# Patient Record
Sex: FEMALE | Race: WHITE | NOT HISPANIC OR LATINO | URBAN - METROPOLITAN AREA
[De-identification: names, ages, dates, MRNs, and addresses within clinical notes are randomized per-mention and may not be internally consistent; named-entity substitution may affect disease eponyms.]

---

## 2017-02-14 ENCOUNTER — OFFICE VISIT - RIVER FALLS (OUTPATIENT)
Dept: FAMILY MEDICINE | Facility: CLINIC | Age: 47
End: 2017-02-14

## 2017-02-14 ASSESSMENT — MIFFLIN-ST. JEOR: SCORE: 1514

## 2017-03-29 ENCOUNTER — OFFICE VISIT - RIVER FALLS (OUTPATIENT)
Dept: FAMILY MEDICINE | Facility: CLINIC | Age: 47
End: 2017-03-29

## 2017-03-29 ASSESSMENT — MIFFLIN-ST. JEOR: SCORE: 1522.62

## 2017-05-01 ENCOUNTER — OFFICE VISIT - RIVER FALLS (OUTPATIENT)
Dept: FAMILY MEDICINE | Facility: CLINIC | Age: 47
End: 2017-05-01

## 2022-02-12 VITALS
BODY MASS INDEX: 34.72 KG/M2 | HEART RATE: 68 BPM | WEIGHT: 203.4 LBS | DIASTOLIC BLOOD PRESSURE: 76 MMHG | SYSTOLIC BLOOD PRESSURE: 122 MMHG | HEIGHT: 64 IN | DIASTOLIC BLOOD PRESSURE: 94 MMHG | BODY MASS INDEX: 35.22 KG/M2 | SYSTOLIC BLOOD PRESSURE: 144 MMHG | WEIGHT: 205.2 LBS | HEART RATE: 92 BPM | TEMPERATURE: 99.1 F | TEMPERATURE: 98.9 F

## 2022-02-12 VITALS
BODY MASS INDEX: 36.32 KG/M2 | HEIGHT: 63 IN | DIASTOLIC BLOOD PRESSURE: 76 MMHG | TEMPERATURE: 98.8 F | HEART RATE: 78 BPM | SYSTOLIC BLOOD PRESSURE: 122 MMHG | OXYGEN SATURATION: 98 % | WEIGHT: 205 LBS

## 2022-02-15 NOTE — PROGRESS NOTES
Patient:   PRABHA KING            MRN: [id number]            FIN: [id number]               Age:   47 years     Sex:  Female     :  1970   Associated Diagnoses:   Motion sickness; Otalgia   Author:   Mohit aDley MD      Chief Complaint   3/29/2017 9:32 AM CDT    Pt. would like ears checked-occasional sharp pain noted @ both. Ear infection @ R ear in Feb. Treat motion sickness when flying.      History of Present Illness   Patient with intermittent episodes of sharp, stabbing ear pain. Has occurred in both but right ear has been worse lately. Had sinus infection in February with ear infection at that time per patient.  Patient reports her ears have been giving her trouble for many years. Feels like they are chronically congested.   Has had problems with flying for several years as well. Reports it starts when she is boarding the plane. Has nausea. Has to keep eyes closed. Does not think it is anxiety or claustrophobia. Has tried dramamine/meclizing without relief. Leaving for [place] soon. Wondering about trying an alternative.      Health Status   Allergies:    Allergic Reactions (All)  Severity Not Documented  Cephalexin (Diarrhea)  Colace (Rash)  Effexor (No reactions were documented)  Flagyl 375 (No reactions were documented)  Lisinopril (Cough)  Pecans (No reactions were documented)  Zyrtec (Severe h/a's)  Nonallergic Reactions (All)  Severity Not Documented  AmLODIPine (Bladder infections)  Penicillin (Yeast inf)  Vicodin (Dry heaving)   Medications:  (Selected)   Prescriptions  Prescribed  Allegra 24 Hour Allergy oral tablet: 1 tab(s) ( 180 mg ), po, daily, PRN: as needed for hives, # 30 tab(s), 5 Refill(s), Type: Maintenance, Pharmacy: [facility] #[id number]  Flonase 50 mcg/inh nasal spray: 2 spray(s), nasal, daily, # 1 EA, 11 Refill(s), Type: Maintenance, Pharmacy: [facility] #[id number], 2 spray(s) nasal daily  hydrochlorothiazide-losartan 12.5 mg-50 mg oral tablet: 1 tab(s), PO, Daily, # 30  tab(s), 11 Refill(s), Type: Maintenance, Pharmacy: [facility] #[id number], 1 tab(s) po daily,x30 day(s)  Documented Medications  Documented  Vitamin C: 0 Refill(s), Type: Maintenance  Vitamin D3: po, daily, 0 Refill(s), Type: Maintenance  ibuprofen 200 mg oral tablet: 1 tab(s) ( 200 mg ), po, q4 hrs, 0 Refill(s), Type: Maintenance   Problem list:    All Problems (Selected)  Seasonal Allergies / ICD-9-.9 / Confirmed  Sacroiliac Joint Dysfunction / ICD-9-.4 / Confirmed  Perimenopausal vasomotor symptoms / SNOMED CT 067741437 / Confirmed  Pain in Back / ICD-9-.5 / Confirmed  Obesity / ICD-9-.00 / Probable  Impaired fasting glucose / SNOMED CT 0816109555 / Confirmed  Hypertension / SNOMED CT 9300901468 / Confirmed  Hyperlipidemia / SNOMED CT 68438303 / Confirmed  History of heat-induced urticaria / SNOMED CT 625409392 / Confirmed  Fibrocystic breast disease in female / ICD-9-.1 / Confirmed  Ex-Smoker / ICD-9-CM V15.82 / Confirmed  DJD (Degenerative Joint Disease) of Lumbar Spine / ICD-9-.3 / Confirmed  Chickenpox / ICD-9-.9 / Confirmed      Histories   Family History:    Diabetes mellitus type II  Mother (Ashley)  Hypertension  Father (Suman)  Heart disease  Father (Suman)  Hypercholesterolemia  Mother (sAhley)  Cancer  Brother (Ramez)  Comments:  8/13/2012 2:37 PM - Nena Langley  Rectal cancer survivor.  Breast Cancer  Sister (Bambi)  Comments:  8/13/2012 2:37 PM - Nena Langley  Breast cancer survivor  Diabetes  Sister (Rose Mary)  Grandmother (M)  Myocardial Infarction  Sister (Rose Mary)  Comments:  8/13/2012 2:37 PM - Nena Langley  Heart attack at the age of 43.  Diverticulitis  Father (Suman)     Procedure history:    Colonoscopy ([id number]) on 4/15/2014 at 44 Years.  Comments:  4/29/2014 5:50 PM - Gladys Vega RN  Sedation: MAC  Indication: family history colon cancer, brother before age 60  Benign Leimomyomata  Repeat in 5 years  left lower abdominal wall endometrioma excision  on 4/15/2014 at 44 Years.  Epidural steroid injection ([id number]) on 10/24/2008 at 38 Years.  Comments:  2012 3:13 PM - Nena Langley  Patient also received epidural steroid injections in 2004.    2012 3:05 PM Nena De La Fuente Dr. at [facility].  Both sides.  L4-5 level.  Laparoscopic supracervical hysterectomy ([id number]) on 2008 at 38 Years.  Comments:  2012 3:06 PM - Nena Langley  Also done:  tension-free transvaginal t ape placement with cystoscopy.  Childbirth ([id number]) in the month of 1996 at 26 Years.  Comments:  2012 2:51 PM - Nena Langley  Vaginal birth of daughterRahel.  Colposcopy ([id number]) on 1992 at 22 Years.  Comments:  2012 3:19 PM - Nena Langley  The endocervical curettings revealed no significant koilocytotic atypia and no evidence of dysplasia.  The uterine cervix biopsy revealed mild koilocytotic atypia, no evidence of dysplasia.  Childbirth ([id number]) on 2/15/1992 at 22 Years.  Comments:  2012 2:48 PM - Nena Langley  .  Birth of sonJesu.      Physical Examination   Vital Signs   3/29/2017 9:32 AM CDT Temperature Tympanic 98.9 DegF    Peripheral Pulse Rate 68 bpm    Pulse Site Radial artery    HR Method Manual    Systolic Blood Pressure 122 mmHg    Diastolic Blood Pressure 76 mmHg    Mean Arterial Pressure 91 mmHg    BP Site Left arm    BP Method Manual      Measurements from flowsheet : Measurements   3/29/2017 9:32 AM CDT Height Measured - Standard 64 in    Weight Measured - Standard 203.4 lb    BSA 2.04 m2    Body Mass Index 34.91 kg/m2      General:  Alert and oriented, No acute distress.    Eye:  Pupils are equal, round and reactive to light, Normal conjunctiva.    HENT:  Normocephalic, Tympanic membranes are clear, Oral mucosa is moist, No pharyngeal erythema, No sinus tenderness.    Neck:  Supple, Non-tender, No lymphadenopathy.    Respiratory:  Lungs are clear to auscultation.    Cardiovascular:   Normal rate, Regular rhythm.       Impression and Plan   Diagnosis     Motion sickness (GIB07-QH T75.3XXA).     Otalgia (ZSP88-NO H92.09).     Course:  Not improving.    Plan:  Will try scopolamine. Consider referral for audiology testing to check for fluids in ears. Will consider after returns from travel..    Orders     Orders   Pharmacy:  ondansetron 4 mg oral tablet, disintegrating (Prescribe): 1 tab(s) ( 4 mg ), PO, q8 hrs, PRN: nausea, # 10 tab(s), 0 Refill(s), Type: Maintenance, Pharmacy: [facility] #[id number], 1 tab(s) po q8 hrs,PRN:nausea  scopolamine 1.5 mg transdermal film, extended release (Prescribe): 1 patch(es) ( 1.5 mg ), TOP, q72hr, PRN: for motion sickness, # 4 EA, 0 Refill(s), Type: Maintenance, Pharmacy: [facility] #[id number], 1 patch(es) top q72 hrs,PRN:for motion sickness.

## 2022-02-15 NOTE — PROGRESS NOTES
Patient:   PRABHA KING            MRN: [id number]            FIN: [id number]               Age:   47 years     Sex:  Female     :  1970   Associated Diagnoses:   Pelvic pain; Vaginal bleeding   Author:   Brittni Olson      Chief Complaint   2017 10:49 AM CDT    Bleeding since Friday- spotting, during BM fills toliet with blood, Right side pain      History of Present Illness   Symptoms and concerns as expressed in CC reviewed and confirmed with patient  SHe is here wtih symptoms very similar to those experienced when seen Dec 30, 2016 by Dr Gerardo.  Hx of hysterectomy with cervix preserved and subsequent problems with bleeding at cervix and endometrial 'mass' that later needed to be removed.  In Dec, she was put on Doxy and cramping and bleeding resolved  She has done well since Dec untill this past weekend when cramping and pain started, has bleeding when she sits down and 'pushes' for passing urine or having bowel movement.  Much of pain is right sided. Bleeding is managed with a mini pad otherwise.  She has many medicaiton allergies and is hesitant to use anything other than motrin which she is taking already. Rates pain 12/10      Health Status   Allergies:    Allergic Reactions (Selected)  Severity Not Documented  Cephalexin (Diarrhea)  Colace (Rash)  Effexor (No reactions were documented)  Flagyl 375 (No reactions were documented)  Lisinopril (Cough)  Pecans (No reactions were documented)  Zyrtec (Severe h/a's)  Nonallergic Reactions (Selected)  Severity Not Documented  AmLODIPine (Bladder infections)  Penicillin (Yeast inf)  Vicodin (Dry heaving)   Medications:  (Selected)   Prescriptions  Prescribed  Allegra 24 Hour Allergy oral tablet: 1 tab(s) ( 180 mg ), po, daily, PRN: as needed for hives, # 30 tab(s), 5 Refill(s), Type: Maintenance, Pharmacy: [facility] #[id number]  Flonase 50 mcg/inh nasal spray: 2 spray(s), nasal, daily, # 1 EA, 11 Refill(s), Type: Maintenance, Pharmacy: [facility]  #[id number], 2 spray(s) nasal daily  hydrochlorothiazide-losartan 12.5 mg-50 mg oral tablet: 1 tab(s), PO, Daily, # 30 tab(s), 11 Refill(s), Type: Maintenance, Pharmacy: [facility] #[id number], 1 tab(s) po daily,x30 day(s)  ondansetron 4 mg oral tablet, disintegratin tab(s) ( 4 mg ), PO, q8 hrs, PRN: nausea, # 10 tab(s), 0 Refill(s), Type: Maintenance, Pharmacy: [facility] #[id number], 1 tab(s) po q8 hrs,PRN:nausea  scopolamine 1.5 mg transdermal film, extended release: 1 patch(es) ( 1.5 mg ), TOP, q72hr, PRN: for motion sickness, # 4 EA, 0 Refill(s), Type: Maintenance, Pharmacy: [facility] #[id number], 1 patch(es) top q72 hrs,PRN:for motion sickness  Documented Medications  Documented  Vitamin C: 0 Refill(s), Type: Maintenance  Vitamin D3: po, daily, 0 Refill(s), Type: Maintenance  ibuprofen 200 mg oral tablet: 1 tab(s) ( 200 mg ), po, q4 hrs, 0 Refill(s), Type: Maintenance   Problem list:    All Problems  Chickenpox / ICD-9-.9 / Confirmed  Hypertension / SNOMED CT 4643763026 / Confirmed  Impaired fasting glucose / SNOMED CT 0285422597 / Confirmed  History of heat-induced urticaria / SNOMED CT 734016677 / Confirmed  Obesity / ICD-9-.00 / Probable  Perimenopausal vasomotor symptoms / SNOMED CT 588128081 / Confirmed  Seasonal Allergies / ICD-9-.9 / Confirmed  Fibrocystic breast disease in female / ICD-9-.1 / Confirmed  DJD (Degenerative Joint Disease) of Lumbar Spine / ICD-9-.3 / Confirmed  Pain in Back / ICD-9-.5 / Confirmed  Patient evaluated at [facility] in 2008.  Problem:  Annular tear with disk culging at the L4-5 and disc degeneration wtih broad-based bulging at L5-S1. Symptoms of back pain and radiating leg pain.  Epidural steroid injections given.  Sacroiliac Joint Dysfunction / ICD-9-.4 / Confirmed  Physical therapy evaluation in 1999.  Hyperlipidemia / SNOMED CT 65071078 / Confirmed  Ex-Smoker / ICD-9-CM V15.82 / Confirmed  Inactive: Otitis,  serous / ICD-9-.4  Inactive: Reactive Airway Disease / ICD-9-.90  Inactive: YESENIA I (Cervical Intraepithelial Neoplasia I) / ICD-9-.11  Resolved: Uterine Fibroid / ICD-9-.9  Symptomatic uterine fibroids.  Hysterectomy performed on 05/21/2008.  Resolved: Pregnancy / SNOMED CT 136281597  Resolved: Pregnancy / SNOMED CT 091924632  Resolved: Cholecystitis, acute / ICD-9-.0  Resolved: Cystitis / ICD-9-.9  Acute cystitis.  Resolved: Urinary stress incontinence / ICD-9-.6  Repaired on 05/21/2008 with the placement of tension-free transvaginal tape placement & cystoscopy.  Canceled: Hypertension / ICD-9-.9  Canceled: UTI (Lower Urinary Tract Infection) / ICD-9-.0      Histories   Past Medical History:    Active  Pain in Back (724.5): Onset in the month of 10/2008 at 38 years  Comments:  8/13/2012 CDT 3:02 PM Nena Friedman  Patient evaluated at [facility] in October of 2008.  Problem:  Annular tear with disk culging at the L4-5 and disc degeneration wtih broad-based bulging at L5-S1. Symptoms of back pain and radiating leg pain.  Epidural steroid injections given.  Fibrocystic breast disease in female (610.1): Onset in 2002 at 31 years.  Sacroiliac Joint Dysfunction (739.4): Onset on 8/21/1999 at 29 years.  Comments:  8/13/2012 CDT 3:16 PM Nena Friedman  Physical therapy evaluation in October of 1999.  Hypertension (5846367898)  Ex-Smoker (V15.82)  DJD (Degenerative Joint Disease) of Lumbar Spine (721.3)  Seasonal Allergies (477.9)  Chickenpox (052.9)  Resolved  Cystitis (595.9): Onset in the month of 3/2010 at 40 years  Resolved.  Comments:  8/13/2012 CDT 3:35 PM Nena Friedman  Acute cystitis.  Cholecystitis, acute (575.0): Onset in 1990 at 20 years.  Resolved.  Uterine Fibroid (218.9):  Resolved.  Comments:  8/13/2012 CDT 3:10 PM CDT - Nena Langley  Symptomatic uterine fibroids.  Hysterectomy performed on 05/21/2008.  Urinary stress incontinence (625.6):   Resolved.  Comments:  8/13/2012 CDT 3:11 PM CDT - Nena Langley  Repaired on 05/21/2008 with the placement of tension-free transvaginal tape placement & cystoscopy.  Pregnancy (714877923):  Resolved on 2/15/1992 at 22 years.  Pregnancy (183062231):  Resolved on 7/30/1996 at 26 years.   Family History:    Diabetes mellitus type II  Mother (Ashley)  Hypertension  Father (Suman)  Heart disease  Father (Suman)  Hypercholesterolemia  Mother (Ashley)  Cancer  Brother (Ramez)  Comments:  8/13/2012 2:37 PM - Nena Langley  Rectal cancer survivor.  Breast Cancer  Sister (Bambi)  Comments:  8/13/2012 2:37 PM - Nena Langley  Breast cancer survivor  Diabetes  Sister (Rose Mary)  Grandmother (NATHALIE)  Myocardial Infarction  Sister (Rose Mary)  Comments:  8/13/2012 2:37 PM - Nena Langley  Heart attack at the age of 43.  Diverticulitis  Father (Suman)     Procedure history:    Colonoscopy (SNOMED CT 182768859) performed by Juan C Sandra on 4/15/2014 at 44 Years.  Comments:  4/29/2014 5:50 PM - Gary VIRGEN, Gladys  Sedation: MAC  Indication: family history colon cancer, brother before age 60  Benign Leimomyomata  Repeat in 5 years  left lower abdominal wall endometrioma excision on 4/15/2014 at 44 Years.  Epidural steroid injection (SNOMED CT 290116324) on 10/24/2008 at 38 Years.  Comments:  8/13/2012 3:13 PM - Nena Langley  Patient also received epidural steroid injections in November of 2004.    8/13/2012 3:05 PM - Nena Langley Dr. at [facility].  Both sides.  L4-5 level.  Laparoscopic supracervical hysterectomy (SNOMED CT 137265658) performed by Magdi Schwartz MD on 5/21/2008 at 38 Years.  Comments:  8/13/2012 3:06 PM - Nena Langley  Also done:  tension-free transvaginal t ape placement with cystoscopy.  Colposcopy (SNOMED CT 8270923344) on 4/7/1992 at 22 Years.  Comments:  8/13/2012 3:19 PM - Langley , Nena  The endocervical curettings revealed no significant koilocytotic atypia and no evidence of dysplasia.  The  uterine cervix biopsy revealed mild koilocytotic atypia, no evidence of dysplasia.   Social History:        Alcohol Assessment: Current                     Comments:                      03/04/2014 - Rae MANena                     occasionally      Tobacco Assessment: Past            Current                     Comments:                      08/19/2010 - Eleanor Ngo                     quit in 2007      Substance Abuse Assessment: Denies Substance Abuse            Never      Employment and Education Assessment            Employed, Work/School description: [employer].      Home and Environment Assessment            Marital status: .  Spouse/Partner name: Ciro Flores.      Exercise and Physical Activity Assessment            Exercise type: Walking.                     Comments:                      08/13/2012 - Nena Langley                     Patient does not exercise on a regular basis.      Other Assessment            Marital Status,         Physical Examination   Vital Signs   5/1/2017 10:49 AM CDT Temperature Tympanic 99.1 DegF    Peripheral Pulse Rate 92 bpm    Systolic Blood Pressure 144 mmHg  HI    Diastolic Blood Pressure 94 mmHg  HI    Mean Arterial Pressure 111 mmHg    BP Site Right arm    BP Method Manual      Measurements from flowsheet : Measurements   5/1/2017 10:49 AM CDT    Weight Measured - Standard                205.2 lb     General:  Alert and oriented, a little tearful with exam.  Abdomen is soft throughout, most tendere bilat lower quadrants.   No CVA tendernee.    Neck:  Supple.    Integumentary:  Warm, Dry, Pink.    Neurologic:  Alert, Oriented.    Cognition and Speech:  Speech clear and coherent.    Psychiatric:  Cooperative, Appropriate mood & affect.       Review / Management   Radiology results   Spoke with Bill ultrasound, , Normal right ovary and RLQ, left lvary with 2 cm cyst but no free fluid.  Cervix with multiple nabothian cycst.      Impression and Plan    Diagnosis     Pelvic pain (WJP28-NH R10.2).     Vaginal bleeding (VDJ04-JQ N93.9).     Patient Instructions:       Counseled: Patient, Regarding diagnosis, Regarding treatment, Regarding medications, Verbalized understanding.    Orders     Orders (Selected)   Prescriptions  Prescribed  doxycycline monohydrate 100 mg oral capsule: 1 cap(s) ( 100 mg ), PO, bid, # 14 cap(s), 0 Refill(s), Type: Maintenance, Pharmacy: [facility] #[id number], 1 cap(s) po bid,x7 day(s)  ondansetron 4 mg oral tablet, disintegratin tab(s) ( 4 mg ), PO, q8 hrs, PRN: nausea, # 10 tab(s), 0 Refill(s), Type: Maintenance, Pharmacy: [facility] #[id number], 1 tab(s) po q8 hrs,PRN:nausea.     I explained the US results to her  She will use zofran and motrin, declines any narcotic  WIll see GYN in the next 1-2 days.

## 2022-02-15 NOTE — PROGRESS NOTES
Patient:   PRABHA KING            MRN: [id number]            FIN: [id number]               Age:   47 years     Sex:  Female     :  1970   Associated Diagnoses:   Acute sinusitis   Author:   Brittni Olson      Chief Complaint   2017 10:37 AM CST   c/o sore throat, cough, wheezing, body aches, fatigue for the past 3 weeks and getting worse        History of Present Illness             The patient presents with sinus problem.  The sinus problem is located in the frontal sinus.  The sinus problem is characterized by nasal congestion, headache and facial pain.  The severity of the sinus problem is moderate.  The sinus problem is worsening.  The sinus problem has lasted for 3 week(s).  The context of the sinus problem: occurred in association with illness.  Exacerbating factors consist of none.  Relieving factors consist of antihistamine, fluids and medication.  Associated symptoms consist of cough, sore throat and denies fever.        Review of Systems            Health Status   Allergies:    Allergic Reactions (Selected)  Severity Not Documented  Cephalexin (Diarrhea)  Colace (Rash)  Effexor (No reactions were documented)  Flagyl 375 (No reactions were documented)  Lisinopril (Cough)  Pecans (No reactions were documented)  Zyrtec (Severe h/a's)  Nonallergic Reactions (Selected)  Severity Not Documented  AmLODIPine (Bladder infections)  Penicillin (Yeast inf)  Vicodin (Dry heaving)   Medications:  (Selected)   Prescriptions  Prescribed  Allegra 24 Hour Allergy oral tablet: 1 tab(s) ( 180 mg ), po, daily, # 30 tab(s), 5 Refill(s), Type: Maintenance, Pharmacy: [facility] #[id number], 1 tab(s) po daily  Azithromycin 5 Day Dose Pack 250 mg oral tablet: 500mg day 1, 250 mg day 2-5, PO, Daily, # 6 tab(s), 0 Refill(s), Type: Maintenance, Pharmacy: [facility] #[id number], 500mg day 1, 250 mg day 2-5 po daily  Diflucan 150 mg oral tablet: 1 tab(s) ( 150 mg ), PO, Once, Instructions: may repeat in 72 hours if still  symptoms present, # 2 tab(s), 0 Refill(s), Type: Soft Stop, Pharmacy: [facility] #[id number], 1 tab(s) po once,Instr:may repeat in 72 hours if still symptoms present  Flonase 50 mcg/inh nasal spray: 2 spray(s), nasal, daily, # 1 EA, 11 Refill(s), Type: Maintenance, Pharmacy: [facility] #[id number], 2 spray(s) nasal daily  hydrochlorothiazide-losartan 12.5 mg-50 mg oral tablet: 1 tab(s), PO, Daily, # 30 tab(s), 11 Refill(s), Type: Maintenance, Pharmacy: [facility] #[id number], 1 tab(s) po daily,x30 day(s)  Documented Medications  Documented  Vitamin C: 0 Refill(s), Type: Maintenance  Vitamin D3: po, daily, 0 Refill(s), Type: Maintenance  ibuprofen 200 mg oral tablet: 1 tab(s) ( 200 mg ), po, q4 hrs, 0 Refill(s), Type: Maintenance   Problem list:    All Problems  Chickenpox / ICD-9-.9 / Confirmed  Hypertension / SNOMED CT 5474104526 / Confirmed  Impaired fasting glucose / SNOMED CT 1285674371 / Confirmed  History of heat-induced urticaria / SNOMED CT 645344964 / Confirmed  Obesity / ICD-9-.00 / Probable  Perimenopausal vasomotor symptoms / SNOMED CT 353130201 / Confirmed  Seasonal Allergies / ICD-9-.9 / Confirmed  Fibrocystic breast disease in female / ICD-9-.1 / Confirmed  DJD (Degenerative Joint Disease) of Lumbar Spine / ICD-9-.3 / Confirmed  Pain in Back / ICD-9-.5 / Confirmed  Patient evaluated at [facility] in October of 2008.  Problem:  Annular tear with disk culging at the L4-5 and disc degeneration wtih broad-based bulging at L5-S1. Symptoms of back pain and radiating leg pain.  Epidural steroid injections given.  Sacroiliac Joint Dysfunction / ICD-9-.4 / Confirmed  Physical therapy evaluation in October of 1999.  Hyperlipidemia / SNOMED CT 98646867 / Confirmed  Ex-Smoker / ICD-9-CM V15.82 / Confirmed  Inactive: Otitis, serous / ICD-9-.4  Inactive: Reactive Airway Disease / ICD-9-.90  Inactive: YESENIA I (Cervical Intraepithelial Neoplasia I) / ICD-9-CM  622.11  Resolved: Uterine Fibroid / ICD-9-.9  Symptomatic uterine fibroids.  Hysterectomy performed on 05/21/2008.  Resolved: Cholecystitis, acute / ICD-9-.0  Resolved: Cystitis / ICD-9-.9  Acute cystitis.  Resolved: Urinary stress incontinence / ICD-9-.6  Repaired on 05/21/2008 with the placement of tension-free transvaginal tape placement & cystoscopy.  Canceled: Hypertension / ICD-9-.9  Canceled: UTI (Lower Urinary Tract Infection) / ICD-9-.0      Histories   Past Medical History:    Active  Pain in Back (724.5): Onset in the month of 10/2008 at 38 years  Comments:  8/13/2012 CDT 3:02 PM Nena Friedman  Patient evaluated at [facility] in October of 2008.  Problem:  Annular tear with disk culging at the L4-5 and disc degeneration wtih broad-based bulging at L5-S1. Symptoms of back pain and radiating leg pain.  Epidural steroid injections given.  Fibrocystic breast disease in female (610.1): Onset in 2002 at 31 years.  Sacroiliac Joint Dysfunction (739.4): Onset on 8/21/1999 at 29 years.  Comments:  8/13/2012 CDT 3:16 PM Nena Friedman  Physical therapy evaluation in October of 1999.  Hypertension (9578082543)  Ex-Smoker (V15.82)  DJD (Degenerative Joint Disease) of Lumbar Spine (721.3)  Seasonal Allergies (477.9)  Chickenpox (052.9)  Resolved  Cystitis (595.9): Onset in the month of 3/2010 at 40 years  Resolved.  Comments:  8/13/2012 CDT 3:35 PM Nena Friedman  Acute cystitis.  Cholecystitis, acute (575.0): Onset in 1990 at 20 years.  Resolved.  Uterine Fibroid (218.9):  Resolved.  Comments:  8/13/2012 CDT 3:10 PM Nena Friedman  Symptomatic uterine fibroids.  Hysterectomy performed on 05/21/2008.  Urinary stress incontinence (625.6):  Resolved.  Comments:  8/13/2012 CDT 3:11 PM CDT - Langley , Nena  Repaired on 05/21/2008 with the placement of tension-free transvaginal tape placement & cystoscopy.   Family History:    Diabetes mellitus type II  Mother  (Ashley)  Hypertension  Father (Suman)  Heart disease  Father (Suman)  Hypercholesterolemia  Mother (Ashley)  Cancer  Brother (Ramez)  Comments:  8/13/2012 2:37 PM - Nena Langley  Rectal cancer survivor.  Breast Cancer  Sister (Bambi)  Comments:  8/13/2012 2:37 PM - Nena Langley  Breast cancer survivor  Diabetes  Sister (Rose Mary)  Grandmother (M)  Myocardial Infarction  Sister (Rose Mary)  Comments:  8/13/2012 2:37 PM - Nena Langley  Heart attack at the age of 43.  Diverticulitis  Father (Suman)     Procedure history:    Colonoscopy (SNOMED CT 613923529) performed by Juan C Sandra on 4/15/2014 at 44 Years.  Comments:  4/29/2014 5:50 PM - Gladys Vega RN  Sedation: MAC  Indication: family history colon cancer, brother before age 60  Benign Leimomyomata  Repeat in 5 years  left lower abdominal wall endometrioma excision on 4/15/2014 at 44 Years.  Epidural steroid injection (SNOMED CT 575186215) on 10/24/2008 at 38 Years.  Comments:  8/13/2012 3:13 PM - Nena Langley  Patient also received epidural steroid injections in November of 2004.    8/13/2012 3:05 PM - Nena Langley Dr. at [facility].  Both sides.  L4-5 level.  Laparoscopic supracervical hysterectomy (SNOMED CT 476841207) performed by Magdi Schwartz MD on 5/21/2008 at 38 Years.  Comments:  8/13/2012 3:06 PM - Nena Langley  Also done:  tension-free transvaginal t ape placement with cystoscopy.  Childbirth (SNOMED CT 8221749019) in the month of 7/1996 at 26 Years.  Comments:  8/13/2012 2:51 PM - Nena Langley  Vaginal birth of daughterRahel.  Colposcopy (SNOMED CT 3515186659) on 4/7/1992 at 22 Years.  Comments:  8/13/2012 3:19 PM - Nena Langley  The endocervical curettings revealed no significant koilocytotic atypia and no evidence of dysplasia.  The uterine cervix biopsy revealed mild koilocytotic atypia, no evidence of dysplasia.  Childbirth (SNOMED CT 9520267741) on 2/15/1992 at 22 Years.  Comments:  8/13/2012 2:48 PM - Shivam  Nena  .  Birth of son, Jesu.   Social History:        Alcohol Assessment: Current                     Comments:                      2014 - Rae MANena                     occasionally      Tobacco Assessment: Past            Current                     Comments:                      2010 - Eleanor Ngo                     quit in       Substance Abuse Assessment: Denies Substance Abuse            Never      Employment and Education Assessment            Employed, Work/School description: [employer].      Home and Environment Assessment            Marital status: .  Spouse/Partner name: Ciro Flores.      Exercise and Physical Activity Assessment            Exercise type: Walking.                     Comments:                      2012 - Nena Langley                     Patient does not exercise on a regular basis.      Other Assessment            Marital Status,         Physical Examination   Vital Signs   2017 10:37 AM CST Temperature Tympanic 98.8 DegF    Peripheral Pulse Rate 78 bpm    Pulse Site Radial artery    HR Method Manual    Systolic Blood Pressure 122 mmHg    Diastolic Blood Pressure 76 mmHg    Mean Arterial Pressure 91 mmHg    BP Site Right arm    BP Method Manual    Oxygen Saturation 98 %      Measurements from flowsheet : Measurements   2017 10:37 AM CST Height Measured - Standard 63 in    Weight Measured - Standard 205 lb    BSA 2.03 m2    Body Mass Index 36.31 kg/m2      General:  Alert and oriented, No acute distress.    Eye:  Normal conjunctiva.    HENT:  Tympanic membranes are clear, Normal hearing, Oral mucosa is moist, No pharyngeal erythema.    Neck:  Non-tender, No lymphadenopathy.    Respiratory:  Lungs are clear to auscultation, Respirations are non-labored, Breath sounds are equal.    Cardiovascular:  Normal rate, Regular rhythm, No murmur.    Integumentary:  Warm, Dry, Pink, No rash.    Neurologic:  Alert, Oriented.     Psychiatric:  Cooperative, Appropriate mood & affect.       Impression and Plan   Diagnosis     Acute sinusitis (ERS39-QU J01.90).     Orders     Orders (Selected)   Prescriptions  Prescribed  Azithromycin 5 Day Dose Pack 250 mg oral tablet: 500mg day 1, 250 mg day 2-5, PO, Daily, # 6 tab(s), 0 Refill(s), Type: Maintenance, Pharmacy: [facility] #[id number], 500mg day 1, 250 mg day 2-5 po daily  Diflucan 150 mg oral tablet: 1 tab(s) ( 150 mg ), PO, Once, Instructions: may repeat in 72 hours if still symptoms present, # 2 tab(s), 0 Refill(s), Type: Soft Stop, Pharmacy: [facility] #[id number], 1 tab(s) po once,Instr:may repeat in 72 hours if still symptoms present.     Use a mucolytic (like guaifenesin/Mucinex/Robitussin) and saline nasal spray, .  If you were prescribed an antibiotic,  a probiotic at the pharmacy to help prevent diarrhea and yeast infections, antibiotics can cause this.      .